# Patient Record
Sex: FEMALE | Race: BLACK OR AFRICAN AMERICAN | NOT HISPANIC OR LATINO | Employment: FULL TIME | URBAN - METROPOLITAN AREA
[De-identification: names, ages, dates, MRNs, and addresses within clinical notes are randomized per-mention and may not be internally consistent; named-entity substitution may affect disease eponyms.]

---

## 2024-10-15 ENCOUNTER — NURSE TRIAGE (OUTPATIENT)
Dept: ADMINISTRATIVE | Facility: CLINIC | Age: 54
End: 2024-10-15
Payer: COMMERCIAL

## 2024-10-15 NOTE — TELEPHONE ENCOUNTER
"Pt called with c/o SOB with exertion. Pt reports becoming very SOB while running. Pt denies any SOB at this time. Dispo- go to office now. Pt requesting appt today. Pt was able to schedule an appt for tomorrow. Offered pt an appt at main campus location, pt declined and stated she is ok with waiting for her appt tomorrow morning.    Reason for Disposition   Patient wants to be seen    Additional Information   Negative: SEVERE difficulty breathing (e.g., struggling for each breath, speaks in single words, pulse > 120)   Negative: Breathing stopped and hasn't returned   Negative: Choking on something   Negative: Bluish (or gray) lips or face   Negative: Difficult to awaken or acting confused (e.g., disoriented, slurred speech)   Negative: Passed out (e.g., fainted, lost consciousness, blacked out and was not responding)   Negative: Wheezing started suddenly after medicine, an allergic food, or bee sting   Negative: Stridor (harsh sound while breathing in)   Negative: Slow, shallow and weak breathing   Negative: Sounds like a life-threatening emergency to the triager   Negative: MODERATE difficulty breathing (e.g., speaks in phrases, SOB even at rest, pulse 100-120) of new-onset or worse than normal   Negative: Oxygen level (e.g., pulse oximetry) 90% or lower   Negative: Wheezing can be heard across the room   Negative: Drooling or spitting out saliva (because can't swallow)   Negative: Any history of prior "blood clot" in leg or lungs   Negative: Illness requiring prolonged bedrest in past month (e.g., immobilization, long hospital stay)   Negative: Hip or leg fracture (broken bone) in past month (or had cast on leg or ankle in past month)   Negative: Major surgery in the past month   Negative: Long-distance travel in past month (e.g., car, bus, train, plane; with trip lasting 6 or more hours)   Negative: Cancer treatment in past six months (or has cancer now)   Negative: Extra heartbeats, irregular heart beating, or " heart is beating very fast (i.e., 'palpitations')   Negative: Fever > 103 F (39.4 C)   Negative: Fever > 101 F (38.3 C) and over 60 years of age   Negative: Fever > 100 F (37.8 C) and bedridden (e.g., nursing home patient, stroke, chronic illness, recovering from surgery)   Negative: Fever > 100 F (37.8 C) and diabetes mellitus or weak immune system (e.g., HIV positive, cancer chemo, splenectomy, organ transplant, chronic steroids)   Negative: Periods where breathing stops and then resumes normally and bedridden (e.g., nursing home patient, CVA)   Negative: Pregnant or postpartum (from 0 to 6 weeks after delivery)   Negative: Patient sounds very sick or weak to the triager   Negative: MILD difficulty breathing (e.g., minimal/no SOB at rest, SOB with walking, pulse < 100) of new-onset or worse than normal   Negative: Longstanding difficulty breathing (e.g., CHF, COPD, emphysema) and worse than normal   Negative: Longstanding difficulty breathing and not responding to usual therapy   Negative: Continuous (nonstop) coughing    Protocols used: Breathing Difficulty-A-OH

## 2024-10-16 ENCOUNTER — OFFICE VISIT (OUTPATIENT)
Dept: FAMILY MEDICINE | Facility: CLINIC | Age: 54
End: 2024-10-16
Payer: COMMERCIAL

## 2024-10-16 VITALS
DIASTOLIC BLOOD PRESSURE: 72 MMHG | SYSTOLIC BLOOD PRESSURE: 138 MMHG | TEMPERATURE: 99 F | HEART RATE: 98 BPM | HEIGHT: 66 IN | OXYGEN SATURATION: 40 % | RESPIRATION RATE: 18 BRPM | WEIGHT: 176.13 LBS | BODY MASS INDEX: 28.31 KG/M2

## 2024-10-16 DIAGNOSIS — Z00.00 ANNUAL PHYSICAL EXAM: Primary | ICD-10-CM

## 2024-10-16 DIAGNOSIS — J45.990 EXERCISE-INDUCED ASTHMA: ICD-10-CM

## 2024-10-16 PROCEDURE — 3075F SYST BP GE 130 - 139MM HG: CPT | Mod: CPTII,S$GLB,,

## 2024-10-16 PROCEDURE — 1159F MED LIST DOCD IN RCRD: CPT | Mod: CPTII,S$GLB,,

## 2024-10-16 PROCEDURE — 99999 PR PBB SHADOW E&M-EST. PATIENT-LVL III: CPT | Mod: PBBFAC,,,

## 2024-10-16 PROCEDURE — 1160F RVW MEDS BY RX/DR IN RCRD: CPT | Mod: CPTII,S$GLB,,

## 2024-10-16 PROCEDURE — 3008F BODY MASS INDEX DOCD: CPT | Mod: CPTII,S$GLB,,

## 2024-10-16 PROCEDURE — 99396 PREV VISIT EST AGE 40-64: CPT | Mod: S$GLB,,,

## 2024-10-16 PROCEDURE — 3078F DIAST BP <80 MM HG: CPT | Mod: CPTII,S$GLB,,

## 2024-10-16 RX ORDER — ALBUTEROL SULFATE 90 UG/1
INHALANT RESPIRATORY (INHALATION)
Qty: 18 G | Refills: 2 | Status: SHIPPED | OUTPATIENT
Start: 2024-10-16

## 2024-10-16 RX ORDER — PREDNISONE 20 MG/1
20 TABLET ORAL EVERY MORNING
Qty: 5 TABLET | Refills: 0 | Status: SHIPPED | OUTPATIENT
Start: 2024-10-16 | End: 2024-10-21

## 2024-10-16 NOTE — PROGRESS NOTES
Family Medicine     Patient name: Faustina Andre  MRN: [id number]  : 1970  PCP NAME: Victoriano Melendrez MD    Active Problem List with Overview Notes    Diagnosis Date Noted    Chest pain, atypical 2022    SANTANA (dyspnea on exertion) 2022       History of Present Illness    Faustina presents today with shortness of breath, chest tightness, and wheezing. She first noticed shortness of breath, chest tightness, and wheezing on Saturday while running, when she had to stop due to difficulty breathing. She describes the chest tightness as feeling like a rubber band, not pain, and reports audible wheezing. The symptoms have been progressively worsening since onset. She denies inflammation or runny nose associated with these respiratory symptoms. While she has experienced breathing difficulties with colds or allergies in the past, this episode is different as there are no other associated symptoms. She had an echocardiogram and stress test approximately 3 years ago as part of a pre-competition evaluation following COVID-19. The tests were negative for ischemia. She was informed that this finding would not affect her long-term health or daily functioning. She denies experiencing any cardiac symptoms during childhood. She reports allergies to bees, horse pollen, and dust. Exposure causes eye swelling and runny nose. Bee and horse pollen allergies result in more severe reactions, including difficulty breathing and potential for shock. She denies any history of asthma or food allergies. She denies family history of asthma, eczema, or food allergies. She is very physically active, participating in marathons and Ironman competitions. She has a race in 10 days and is concerned about being prepared for the event. She reports recent use of an ozone machine at home for sanitizing and disinfecting purposes. She describes the device as splitting oxygen atoms to create a gas in the room and expresses concern  about possible irritant exposure from the ozone machine, suggesting it may be a contributing factor to her symptoms.      ROS:  General: -fever, -chills, -fatigue, -weight gain, -weight loss  Eyes: -vision changes, -redness, -discharge  ENT: -ear pain, -nasal congestion, -sore throat  Cardiovascular: -chest pain, -palpitations, -lower extremity edema, +chest tightness  Respiratory: -cough, +shortness of breath, +wheezing  Gastrointestinal: -abdominal pain, -nausea, -vomiting, -diarrhea, -constipation, -blood in stool  Genitourinary: -dysuria, -hematuria, -frequency  Musculoskeletal: -joint pain, -muscle pain  Skin: -rash, -lesion  Neurological: -headache, -dizziness, -numbness, -tingling  Psychiatric: -anxiety, -depression, -sleep difficulty          Past Medical History:   Diagnosis Date    Menarche     Age of onset 15       Past Surgical History:   Procedure Laterality Date    COLONOSCOPY N/A 5/19/2023    Procedure: COLONOSCOPY;  Surgeon: Rosa Caro MD;  Location: [facility] ([address] [place] [address]);  Service: Endoscopy;  Laterality: N/A;  2nd floor availability-any md-peg prep-instr portal-tb    NASAL SEPTUM SURGERY      NECK SURGERY      fusion        Family History   Problem Relation Name Age of Onset    Hypertension Paternal Grandfather      Heart disease Paternal Grandfather      Hypertension Paternal Grandmother      Heart disease Paternal Grandmother      Colon cancer Maternal Grandfather      Heart disease Father      Hypertension Father      Uterine cancer Mother      Breast cancer Maternal Aunt      Breast cancer Maternal Aunt      Colon cancer Maternal Uncle      Ovarian cancer Neg Hx      Cervical cancer Neg Hx          Social History     Socioeconomic History    Marital status:    Tobacco Use    Smoking status: Never    Smokeless tobacco: Never   Substance and Sexual Activity    Alcohol use: Yes    Drug use: No    Sexual activity: Yes     Partners: Male     Birth control/protection: None     Social  "Drivers of Health     Financial Resource Strain: Low Risk  (10/15/2024)    Overall Financial Resource Strain (CARDIA)     Difficulty of Paying Living Expenses: Not hard at all   Food Insecurity: No Food Insecurity (10/15/2024)    Hunger Vital Sign     Worried About Running Out of Food in the Last Year: Never true     Ran Out of Food in the Last Year: Never true   Physical Activity: Sufficiently Active (10/15/2024)    Exercise Vital Sign     Days of Exercise per Week: 5 days     Minutes of Exercise per Session: 60 min   Stress: No Stress Concern Present (10/15/2024)    [employer] - Occupational Stress Questionnaire     Feeling of Stress : Not at all   Housing Stability: Unknown (10/15/2024)    Housing Stability Vital Sign     Unable to Pay for Housing in the Last Year: No       /72   Pulse 98   Temp 98.7 °F (37.1 °C) (Oral)   Resp 18   Ht 5' 6" (1.676 m)   Wt 79.9 kg (176 lb 2.4 oz)   SpO2 (!) 40%   BMI 28.43 kg/m²     Physical Exam    General: No acute distress. Well-developed. Well-nourished.  Eyes: EOMI. Sclerae anicteric.  HENT: Normocephalic. Atraumatic. Nares patent. Moist oral mucosa.  Ears: Bilateral TMs clear. Bilateral EACs clear.  Cardiovascular: Regular rate. Regular rhythm. No murmurs. No rubs. No gallops. Normal S1, S2.  Respiratory: Normal respiratory effort. Clear to auscultation bilaterally. No rales. No rhonchi. No wheezing. Clear chest sounds.  Abdomen: Soft. Non-tender. Non-distended. Normoactive bowel sounds.  Musculoskeletal: No  obvious deformity.  Extremities: No lower extremity edema.  Neurological: Alert & oriented x3. No slurred speech. Normal gait.  Psychiatric: Normal mood. Normal affect. Good insight. Good judgment.  Skin: Warm. Dry. No rash.          Assessment and plan   Faustina was seen today for follow-up.    Diagnoses and all orders for this visit:    Annual physical exam  Counseled on age appropriate medical preventative services, including " age appropriate cancer screenings, over all nutritional health, need for a consistent exercise regimen and an over all push towards maintaining a vigorous and active lifestyle. Counseled on age appropriate vaccines and discussed upcoming health care needs based on age/gender. Spent time with patient counseling on need for a good patient/doctor relationship moving forward. Discussed use of common OTC medications and supplements. Discussed common dietary aids and use of caffeine and the need for good sleep hygiene and stress management.    Exercise-induced asthma  -     Complete PFT with bronchodilator and FeNO; Future  -     predniSONE (DELTASONE) 20 MG tablet; Take 1 tablet (20 mg total) by mouth every morning. for 5 days  -     albuterol (PROVENTIL/VENTOLIN HFA) 90 mcg/actuation inhaler; Inhale 2 puffs into the lungs 5 - 20 minutes before exercise       Problem List Items Addressed This Visit    None  Visit Diagnoses       Annual physical exam    -  Primary    Exercise-induced asthma        Relevant Medications    predniSONE (DELTASONE) 20 MG tablet    albuterol (PROVENTIL/VENTOLIN HFA) 90 mcg/actuation inhaler    Other Relevant Orders    Complete PFT with bronchodilator and FeNO            Assessment & Plan    Suspected exercise-induced asthma or lung hyperreactivity based on reported symptoms during physical activity  Considered recent exposure to ozone machine as potential trigger  Ruled out need for cardiac workup due to previous normal echo and stress test  Will proceed with formal lung function testing to confirm diagnosis and determine treatment plan  Opted for short-term management with albuterol inhaler and oral steroids to allow patient to compete in upcoming race    ADULT-ONSET ASTHMA:  - Explained concept of adult-onset asthma and how genetic predisposition can manifest later in life.  - Explained that wheezing is caused by airway constriction, indicating hyperreactivity.  - Faustina to avoid using ozone  machine in living spaces.  - Started albuterol inhaler: Use a few puffs as needed before physical exertion or if feeling short of breath.  - Started oral steroids: Course to reduce inflammation.  - Lung function test scheduled at hospital.  - Avoid using inhaler or steroids 2-3 days before scheduled lung function test.    EXERCISE-INDUCED ASTHMA:  - Discussed exercise-induced asthma and its symptoms.  - Recommend considering swimming as a low-impact exercise alternative.  - Faustina to research exercise-induced asthma for better understanding.    FOLLOW UP:  - Follow up after lung function test results are available.          Medication List with Changes/Refills   New Medications    ALBUTEROL (PROVENTIL/VENTOLIN HFA) 90 MCG/ACTUATION INHALER    Inhale 2 puffs into the lungs 5 - 20 minutes before exercise    PREDNISONE (DELTASONE) 20 MG TABLET    Take 1 tablet (20 mg total) by mouth every morning. for 5 days   Current Medications    AZELASTINE (ASTELIN) 137 MCG (0.1 %) NASAL SPRAY    1 spray (137 mcg total) by Nasal route 2 (two) times daily.    MULTIVITAMIN CAPSULE    Take 1 capsule by mouth once daily.         Victoriano Melendrez MD        This note was generated with the assistance of ambient listening technology. Verbal consent was obtained by the patient and accompanying visitor(s) for the recording of patient appointment to facilitate this note. I attest to having reviewed and edited the generated note for accuracy, though some syntax or spelling errors may persist. Please contact the author of this note for any clarification.

## 2025-02-06 ENCOUNTER — PATIENT MESSAGE (OUTPATIENT)
Dept: FAMILY MEDICINE | Facility: CLINIC | Age: 55
End: 2025-02-06
Payer: COMMERCIAL

## 2025-02-07 ENCOUNTER — TELEPHONE (OUTPATIENT)
Dept: FAMILY MEDICINE | Facility: CLINIC | Age: 55
End: 2025-02-07
Payer: COMMERCIAL

## 2025-02-07 NOTE — TELEPHONE ENCOUNTER
----- Message from Dave sent at 2/7/2025  4:26 PM CST -----  Type : Patient Call        Who Called : Patient         Does the patient know what this is regarding?: Requesting a call back to schedule an PFT appt. Please advise         Would the patient rather a call back or a response via [facility]? Call         Best Call Back Number: [phone]          Additional Information:

## 2025-02-13 DIAGNOSIS — Z12.31 OTHER SCREENING MAMMOGRAM: ICD-10-CM

## 2025-02-20 ENCOUNTER — HOSPITAL ENCOUNTER (OUTPATIENT)
Dept: RESPIRATORY THERAPY | Facility: HOSPITAL | Age: 55
Discharge: HOME | End: 2025-02-20
Payer: COMMERCIAL

## 2025-02-20 DIAGNOSIS — R06.09 DOE (DYSPNEA ON EXERTION): ICD-10-CM

## 2025-02-20 DIAGNOSIS — J45.990 EXERCISE-INDUCED ASTHMA: ICD-10-CM

## 2025-02-20 DIAGNOSIS — J45.990 EXERCISE-INDUCED ASTHMA: Primary | ICD-10-CM

## 2025-02-20 RX ORDER — ALBUTEROL SULFATE 2.5 MG/.5ML
2.5 SOLUTION RESPIRATORY (INHALATION) ONCE
Status: DISCONTINUED | OUTPATIENT
Start: 2025-02-20 | End: 2025-02-21 | Stop reason: HOSPADM

## 2025-02-25 ENCOUNTER — HOSPITAL ENCOUNTER (OUTPATIENT)
Dept: RESPIRATORY THERAPY | Facility: HOSPITAL | Age: 55
Discharge: HOME | End: 2025-02-25
Payer: COMMERCIAL

## 2025-02-25 VITALS — OXYGEN SATURATION: 99 % | RESPIRATION RATE: 18 BRPM | HEART RATE: 53 BPM

## 2025-02-25 DIAGNOSIS — J45.990 EXERCISE-INDUCED ASTHMA: ICD-10-CM

## 2025-02-25 DIAGNOSIS — R06.09 DOE (DYSPNEA ON EXERTION): ICD-10-CM

## 2025-02-25 LAB
BRPFT: NORMAL
DLCO ADJ PRE: 21.49 ML/(MIN*MMHG)
DLCO SINGLE BREATH LLN: 19.51
DLCO SINGLE BREATH PRE REF: 85.1 %
DLCO SINGLE BREATH REF: 25.24
DLCOC SBVA LLN: 3.36
DLCOC SBVA PRE REF: 103 %
DLCOC SBVA REF: 4.76
DLCOC SINGLE BREATH LLN: 19.51
DLCOC SINGLE BREATH PRE REF: 85.1 %
DLCOC SINGLE BREATH REF: 25.24
DLCOVA LLN: 3.36
DLCOVA PRE REF: 103 %
DLCOVA PRE: 4.91 ML/(MIN*MMHG*L)
DLCOVA REF: 4.76
DLVAADJ PRE: 4.91 ML/(MIN*MMHG*L)
ERVN2 LLN: -16449.06
ERVN2 PRE REF: 96.6 %
ERVN2 PRE: 0.91 L
ERVN2 REF: 0.94
FEF 25 75 CHG: 46.5 %
FEF 25 75 LLN: 1.85
FEF 25 75 POST REF: 75.7 %
FEF 25 75 PRE REF: 51.7 %
FEF 25 75 REF: 3.25
FET100 CHG: 19.8 %
FEV1 CHG: 13.4 %
FEV1 FVC CHG: 6.1 %
FEV1 FVC LLN: 69
FEV1 FVC POST REF: 96.9 %
FEV1 FVC PRE REF: 91.4 %
FEV1 FVC REF: 80
FEV1 LLN: 1.87
FEV1 POST REF: 104.3 %
FEV1 PRE REF: 91.9 %
FEV1 REF: 2.5
FRCN2 LLN: 1.99
FRCN2 PRE REF: 89.2 %
FRCN2 REF: 2.82
FVC CHG: 6.9 %
FVC LLN: 2.37
FVC POST REF: 107 %
FVC PRE REF: 100.1 %
FVC REF: 3.12
IVC PRE: 2.95 L
IVC SINGLE BREATH LLN: 2.37
IVC SINGLE BREATH PRE REF: 94.6 %
IVC SINGLE BREATH REF: 3.12
PEF CHG: -15.5 %
PEF LLN: 4.3
PEF POST REF: 78.6 %
PEF PRE REF: 93 %
PEF REF: 6.44
POST FEF 25 75: 2.46 L/S
POST FET 100: 8.44 SEC
POST FEV1 FVC: 77.92 %
POST FEV1: 2.6 L
POST FVC: 3.34 L
POST PEF: 5.06 L/S
PRE DLCO: 21.49 ML/(MIN*MMHG)
PRE FEF 25 75: 1.68 L/S
PRE FET 100: 7.05 SEC
PRE FEV1 FVC: 73.45 %
PRE FEV1: 2.3 L
PRE FRC N2: 2.51 L
PRE FVC: 3.13 L
PRE PEF: 5.99 L/S
RVN2 LLN: 1.3
RVN2 PRE REF: 85.4 %
RVN2 PRE: 1.6 L
RVN2 REF: 1.87
RVN2TLCN2 LLN: 27.05
RVN2TLCN2 PRE REF: 93.9 %
RVN2TLCN2 PRE: 34.41 %
RVN2TLCN2 REF: 36.64
TLCN2 LLN: 4.31
TLCN2 PRE REF: 87.8 %
TLCN2 PRE: 4.65 L
TLCN2 REF: 5.3
VA PRE: 4.38 L
VA SINGLE BREATH LLN: 5.15
VA SINGLE BREATH PRE REF: 85.1 %
VA SINGLE BREATH REF: 5.15
VCMAXN2 LLN: 2.37
VCMAXN2 PRE REF: 97.6 %
VCMAXN2 PRE: 3.05 L
VCMAXN2 REF: 3.12

## 2025-02-25 PROCEDURE — 94727 GAS DIL/WSHOT DETER LNG VOL: CPT | Mod: 26,,, | Performed by: INTERNAL MEDICINE

## 2025-02-25 PROCEDURE — 25000242 PHARM REV CODE 250 ALT 637 W/ HCPCS

## 2025-02-25 PROCEDURE — 94200 LUNG FUNCTION TEST (MBC/MVV): CPT

## 2025-02-25 PROCEDURE — 94729 DIFFUSING CAPACITY: CPT

## 2025-02-25 PROCEDURE — 94729 DIFFUSING CAPACITY: CPT | Mod: 26,,, | Performed by: INTERNAL MEDICINE

## 2025-02-25 PROCEDURE — 94060 EVALUATION OF WHEEZING: CPT | Mod: 26,,, | Performed by: INTERNAL MEDICINE

## 2025-02-25 RX ORDER — ALBUTEROL SULFATE 2.5 MG/.5ML
2.5 SOLUTION RESPIRATORY (INHALATION) ONCE
Status: COMPLETED | OUTPATIENT
Start: 2025-02-25 | End: 2025-02-25

## 2025-02-25 RX ADMIN — ALBUTEROL SULFATE 2.5 MG: 2.5 SOLUTION RESPIRATORY (INHALATION) at 02:02

## 2025-02-26 ENCOUNTER — RESULTS FOLLOW-UP (OUTPATIENT)
Dept: FAMILY MEDICINE | Facility: CLINIC | Age: 55
End: 2025-02-26
Payer: COMMERCIAL

## 2025-03-30 NOTE — PROGRESS NOTES
"Faustina Andre is a 54 y.o. female  who presents for annual exam.  She is perimenopausal with periods occurring every 1-3 months, lasting for several days in duration, without intermenstrual bleeding.  She did not have a period October, November, or December, but then a period January and February.  Denies flashes and sweats.  She has a history of asymptomatic fibroids, followed conservatively.  For the past several months, she describes noting a mild tender lump in her left breast at 9:00.  Denies nipple discharge.  Requests pap today.  Reports being diagnosed with exercise induced asthma.  Otherwise, denies recent changes in her medical / surgical history.        Blood pressure 102/60, height 5' 6" (1.676 m), weight 78.2 kg (172 lb 6.4 oz), last menstrual period 2025.    UPT today: Negative    24 Pap: Negative, HPV: Negative    24 Mammogram: Negative, TC 13.91%      Past Medical History:   Diagnosis Date    Exercise-induced asthma     Menarche     Age of onset 15       Past Surgical History:   Procedure Laterality Date    COLONOSCOPY N/A 2023    Procedure: COLONOSCOPY;  Surgeon: Rosa Caro MD;  Location: [facility] ([address] [place] [address]);  Service: Endoscopy;  Laterality: N/A;  2nd floor availability-any md-peg prep-instr portal-tb    COSMETIC SURGERY  2006 Rhinophasty    to correct a deviated septum    NASAL SEPTUM SURGERY      NECK SURGERY      fusion    SPINE SURGERY  2004    two plates in my neck       OB History          3    Para   3    Term   3            AB        Living             SAB        IAB        Ectopic        Multiple        Live Births                     ROS:  GENERAL: Feeling well overall.   SKIN: Denies rash or lesions.   HEAD: Denies head injury or headache.   NODES: Denies enlarged lymph nodes.   CHEST: Reports exercise induced asthma.  CARDIOVASCULAR: Denies palpitations or left sided chest pain.   ABDOMEN: No abdominal pain, nausea, vomiting or " rectal bleeding.   URINARY: No dysuria or hematuria.  REPRODUCTIVE: See HPI.   BREASTS: Reports lump / tenderness left breast.  HEMATOLOGIC: No easy bruisability or excessive bleeding.   MUSCULOSKELETAL: Denies joint pain or swelling.   NEUROLOGIC: Denies syncope or weakness.   PSYCHIATRIC: Denies depression.    PE:   (chaperone present during entire exam)  APPEARANCE: Well nourished, well developed, in no acute distress.  BREASTS: Symmetrical.  Generalized nodularity throughout both breast consistent with fibrocystic changes- more prominent at 9:00 left breast.  Otherwise, no palpable dominant masses, nipple discharge, or adenopathy bilaterally.  ABDOMEN: Soft. No tenderness or masses.   VULVA: No lesions. Normal female genitalia.  URETHRAL MEATUS: Normal size and location, no lesions, no prolapse.  URETHRA: No masses, tenderness, prolapse or scarring.  VAGINA: No lesions, no abnormal discharge, no significant cystocele or rectocele.  CERVIX: No lesions and discharge. PAP done.  UTERUS: Normal size, regular shape, mobile, non-tender, bladder base nontender.  ADNEXA: No masses, tenderness or CDS nodularity.  ANUS PERINEUM: Normal.      Diagnosis:  1. Women's annual routine gynecological examination    2. Perimenopausal    3. Pap smear for cervical cancer screening    4. Fibrocystic breast changes of both breasts    5. Breast lump on left side at 9 o'clock position          PLAN:    Orders Placed This Encounter    Mammo Digital Diagnostic Bilat with Ag (XPD)    US Breast Left Limited    Liquid-Based Pap Smear, Screening       Patient was counseled today on perimenopausal issues and expected bleeding patterns.  We discussed her breast symptoms and exam findings.  She will have a diagnostic mammogram / left breast ultrasound for additional evaluation.     Follow-up in 1 year.    This note was created with voice recognition software.  Grammatical, syntax and spelling errors may be inevitable.

## 2025-03-31 ENCOUNTER — OFFICE VISIT (OUTPATIENT)
Dept: OBSTETRICS AND GYNECOLOGY | Facility: CLINIC | Age: 55
End: 2025-03-31
Payer: COMMERCIAL

## 2025-03-31 VITALS
HEIGHT: 66 IN | SYSTOLIC BLOOD PRESSURE: 102 MMHG | WEIGHT: 172.38 LBS | BODY MASS INDEX: 27.7 KG/M2 | DIASTOLIC BLOOD PRESSURE: 60 MMHG

## 2025-03-31 DIAGNOSIS — N60.11 FIBROCYSTIC BREAST CHANGES OF BOTH BREASTS: ICD-10-CM

## 2025-03-31 DIAGNOSIS — N63.25 BREAST LUMP ON LEFT SIDE AT 9 O'CLOCK POSITION: ICD-10-CM

## 2025-03-31 DIAGNOSIS — N95.1 PERIMENOPAUSAL: ICD-10-CM

## 2025-03-31 DIAGNOSIS — Z01.419 WOMEN'S ANNUAL ROUTINE GYNECOLOGICAL EXAMINATION: Primary | ICD-10-CM

## 2025-03-31 DIAGNOSIS — Z12.4 PAP SMEAR FOR CERVICAL CANCER SCREENING: ICD-10-CM

## 2025-03-31 DIAGNOSIS — N60.12 FIBROCYSTIC BREAST CHANGES OF BOTH BREASTS: ICD-10-CM

## 2025-03-31 PROCEDURE — 87624 HPV HI-RISK TYP POOLED RSLT: CPT | Performed by: OBSTETRICS & GYNECOLOGY

## 2025-03-31 PROCEDURE — 99999 PR PBB SHADOW E&M-EST. PATIENT-LVL III: CPT | Mod: PBBFAC,,, | Performed by: OBSTETRICS & GYNECOLOGY

## 2025-03-31 PROCEDURE — 88175 CYTOPATH C/V AUTO FLUID REDO: CPT | Mod: TC | Performed by: OBSTETRICS & GYNECOLOGY

## 2025-04-08 ENCOUNTER — PATIENT MESSAGE (OUTPATIENT)
Dept: OBSTETRICS AND GYNECOLOGY | Facility: CLINIC | Age: 55
End: 2025-04-08
Payer: COMMERCIAL

## 2025-04-21 ENCOUNTER — PATIENT MESSAGE (OUTPATIENT)
Dept: OBSTETRICS AND GYNECOLOGY | Facility: CLINIC | Age: 55
End: 2025-04-21
Payer: COMMERCIAL

## 2025-04-21 ENCOUNTER — HOSPITAL ENCOUNTER (OUTPATIENT)
Dept: RADIOLOGY | Facility: HOSPITAL | Age: 55
Discharge: HOME | End: 2025-04-21
Attending: OBSTETRICS & GYNECOLOGY
Payer: COMMERCIAL

## 2025-04-21 DIAGNOSIS — N60.12 FIBROCYSTIC BREAST CHANGES OF BOTH BREASTS: ICD-10-CM

## 2025-04-21 DIAGNOSIS — N60.11 FIBROCYSTIC BREAST CHANGES OF BOTH BREASTS: ICD-10-CM

## 2025-04-21 DIAGNOSIS — N63.25 BREAST LUMP ON LEFT SIDE AT 9 O'CLOCK POSITION: ICD-10-CM

## 2025-04-21 PROCEDURE — 77062 BREAST TOMOSYNTHESIS BI: CPT | Mod: 26,,, | Performed by: RADIOLOGY

## 2025-04-21 PROCEDURE — 76642 ULTRASOUND BREAST LIMITED: CPT | Mod: 26,LT,, | Performed by: RADIOLOGY

## 2025-04-21 PROCEDURE — 77066 DX MAMMO INCL CAD BI: CPT | Mod: TC

## 2025-04-21 PROCEDURE — 76642 ULTRASOUND BREAST LIMITED: CPT | Mod: TC,LT

## 2025-04-21 PROCEDURE — 77066 DX MAMMO INCL CAD BI: CPT | Mod: 26,,, | Performed by: RADIOLOGY
